# Patient Record
Sex: MALE | Race: WHITE | NOT HISPANIC OR LATINO | Employment: OTHER | URBAN - METROPOLITAN AREA
[De-identification: names, ages, dates, MRNs, and addresses within clinical notes are randomized per-mention and may not be internally consistent; named-entity substitution may affect disease eponyms.]

---

## 2017-02-15 ENCOUNTER — OFFICE VISIT (OUTPATIENT)
Dept: FAMILY MEDICINE CLINIC | Facility: CLINIC | Age: 62
End: 2017-02-15

## 2017-02-15 VITALS
DIASTOLIC BLOOD PRESSURE: 70 MMHG | SYSTOLIC BLOOD PRESSURE: 100 MMHG | HEART RATE: 88 BPM | BODY MASS INDEX: 25.11 KG/M2 | WEIGHT: 160 LBS | TEMPERATURE: 97.7 F | HEIGHT: 67 IN | OXYGEN SATURATION: 98 %

## 2017-02-15 DIAGNOSIS — E78.1 PURE HYPERGLYCERIDEMIA: Primary | ICD-10-CM

## 2017-02-15 DIAGNOSIS — I25.10 ASCVD (ARTERIOSCLEROTIC CARDIOVASCULAR DISEASE): ICD-10-CM

## 2017-02-15 PROCEDURE — 99212 OFFICE O/P EST SF 10 MIN: CPT | Performed by: FAMILY MEDICINE

## 2017-02-15 NOTE — PROGRESS NOTES
Chief Complaint and HPI   I have reviewed the patient's medical history in detail and updated the computerized patient record.    Subjective: Ollie Flood is a 61 y.o. male presents   here today for .    Congestive Heart Failure (heart MD= Dr. Vásquez-good exercise tolerance-has ventricular assist device); Anticoagulation (checked q 6 weeks); and Hyperlipidemia (managed by [facility])      Review of Systems   Constitutional: Negative.    HENT: Negative.    Eyes: Negative.    Respiratory: Negative.    Cardiovascular: Negative.    Gastrointestinal: Negative.    Endocrine: Negative.    Genitourinary: Negative.    Musculoskeletal: Negative.    Skin: Negative.    Allergic/Immunologic: Negative.    Neurological: Negative.    Hematological: Negative.    Psychiatric/Behavioral: Negative.        Physical Exam   Constitutional: He is oriented to person, place, and time. He appears well-developed and well-nourished.   Cardiovascular: Normal rate, regular rhythm and normal heart sounds.    Pulmonary/Chest: Effort normal and breath sounds normal.   Neurological: He is alert and oriented to person, place, and time.   Vitals reviewed.      Procedures    Assessment:   Diagnosis Plan   1. Pure hyperglyceridemia     2. ASCVD (arteriosclerotic cardiovascular disease)         Plan:  No orders of the defined types were placed in this encounter.      Requested Prescriptions      No prescriptions requested or ordered in this encounter       All tests and consults since last visit reviewed with patient    Return in about 1 year (around 2/15/2018) for Recheck.

## 2017-03-03 ENCOUNTER — TELEPHONE (OUTPATIENT)
Dept: CARDIOLOGY | Facility: CLINIC | Age: 62
End: 2017-03-03

## 2017-03-03 ENCOUNTER — CLINICAL SUPPORT NO REQUIREMENTS (OUTPATIENT)
Dept: CARDIOLOGY | Facility: CLINIC | Age: 62
End: 2017-03-03

## 2017-03-03 DIAGNOSIS — I50.22 CHRONIC SYSTOLIC CONGESTIVE HEART FAILURE (HCC): ICD-10-CM

## 2017-03-03 DIAGNOSIS — I47.20 VENTRICULAR TACHYCARDIA (HCC): Primary | ICD-10-CM

## 2017-03-03 NOTE — TELEPHONE ENCOUNTER
"Dr Ruiz,     We saw Mr Flood in the pacer clinic in [facility] for CAMACHO. Coincidentally it was found he had received ATP therapy yesterday during what he described as \" a panic\" to get help for his wife who had fallen at the nursing home. He let himself get \"worked  up\" because help did not arrive as quickly as he hoped.    Seen in clinic to [facility] for CAMACHO. Battery conts at 2.63V but has not reached CAMACHO. Normal CRT-D function, parameters within limits. Presents AP/BiVP @ 70bpm. Apace 41.7%, Total VPace 97.8%. Of that, BiVP 96.2%, VSR 2.0%. VS 1.8%. 3 NST and 1 FVT/VF episode all occurring 3/2/17 at 7251-7142. terminating after successful ATP seq x1. Pt states he knows his HR was up yesterday during a panic attack. His wife had fallen at the nursing home and he was trying to get her help. his panick attack coincides with the time of his tachy events. No other events recorded, no changes made. Msg and strips to Dr Ruiz. Pt verifies he knows to call if he receives a shock. He does not have a home monitor. We will re eval in the clinic in 6 weeks to watch for CAMACHO. Demonstrated alert tone. Pt states he has been listening for it and will cont to do so. I put a copy of his strips on your desk. Let me know if you need anything else.  ~ Silvana      "

## 2017-03-03 NOTE — TELEPHONE ENCOUNTER
"Dr Vásquez,    Just wanted to let you know we saw Mr Flood in the pacer clinic in [facility] for CAMACHO. Coincidentally it was found he had received ATP therapy yesterday during what he described as \" a panic\" to get help for his wife who had fallen at the nursing home. He let himself get \"worked  up\" because help did not arrive as quickly as he hoped. I also sent this msg and strips to Dr Ruiz for review.    Seen in clinic to [facility] for CAMACHO. Battery conts at 2.63V but has not reached CAMACHO. Normal CRT-D function, parameters within limits. Presents AP/BiVP @ 70bpm. Apace 41.7%, Total VPace 97.8%. Of that, BiVP 96.2%, VSR 2.0%. VS 1.8%. 3 NST and 1 FVT/VF episode all occurring 3/2/17 at 9507-1802. terminating after successful ATP seq x1. Pt states he knows his HR was up yesterday during a panic attack. His wife had fallen at the nursing home and he was trying to get her help. his panick attack coincides with the time of his tachy events. No other events recorded, no changes made. Msg and strips to Dr Ruiz. Pt verifies he knows to call if he receives a shock. He does not have a home monitor. We will re eval in the clinic in 6 weeks to watch for CAMACHO. Demonstrated alert tone. Pt states he has been listening for it and will cont to do so.  ~ Silvana    "

## 2017-04-03 ENCOUNTER — CLINICAL SUPPORT NO REQUIREMENTS (OUTPATIENT)
Dept: CARDIOLOGY | Facility: CLINIC | Age: 62
End: 2017-04-03

## 2017-04-03 ENCOUNTER — TELEPHONE (OUTPATIENT)
Dept: CARDIOLOGY | Facility: CLINIC | Age: 62
End: 2017-04-03

## 2017-04-03 DIAGNOSIS — I42.9 CARDIOMYOPATHY (HCC): Primary | ICD-10-CM

## 2017-04-03 NOTE — TELEPHONE ENCOUNTER
Dr. Ruiz and Dr. Vásquez,     Pt's Medtronic CRT-D device has received CAMACHO, see below interrogation note. He has a HMII LVAD.  I'm not sure how to proceed with aiding him in scheduling the generator change at [facility].  Can you please advise?    Pt called and stated his device was beeping. Pt brought to the clinic and he is RRT (CAMACHO) as of 3/29/17. Presenting rhythm AS/BVP at 82. Automated testing WNL. Pt's fluid index > threshold in mid March and has since fallen well below the threshold. Thoracic impedance back at baseline. 2 VT episodes since last interrogation. One lasting 8 beats and one lasting 56 beats. EGM reviewed and no therapies delivered; this was appropriate per pt's settings. Pt did inform me that the day these events occurred, his wife had passed away.     Pt does have a HMII LVAD and I believe his generator change will need to be arranged at [facility].

## 2017-04-03 NOTE — TELEPHONE ENCOUNTER
I discussed with JÚNIOR Wallace, who used to work with the [facility] LVAD program. She stated to send the last interrogation report to the LVAD coordinator, Jocelyn, whom would arrange for generator change for pt.     Report faxed to [phone]    I called pt and informed him that the information was faxed and to be expecting a call from his LVAD program.

## 2017-04-03 NOTE — TELEPHONE ENCOUNTER
I would touch base with Dr Ruiz, if he cannot do than we need to notify the LVAD dept there to arrange it.MJI

## 2017-04-14 ENCOUNTER — TELEPHONE (OUTPATIENT)
Dept: CARDIOLOGY | Facility: CLINIC | Age: 62
End: 2017-04-14

## 2017-04-14 NOTE — TELEPHONE ENCOUNTER
Patient has some questions regarding his pacemaker and would like for you to call him.    He can be reached at [phone].    Thank you.

## 2017-04-16 NOTE — TELEPHONE ENCOUNTER
Called spoke with him he had his questiones answered he is going forward with generator change.MONCIA

## 2017-05-01 ENCOUNTER — OFFICE VISIT (OUTPATIENT)
Dept: FAMILY MEDICINE CLINIC | Facility: CLINIC | Age: 62
End: 2017-05-01

## 2017-05-01 ENCOUNTER — HOSPITAL ENCOUNTER (OUTPATIENT)
Dept: GENERAL RADIOLOGY | Facility: HOSPITAL | Age: 62
Discharge: HOME | End: 2017-05-01
Admitting: NURSE PRACTITIONER

## 2017-05-01 VITALS
DIASTOLIC BLOOD PRESSURE: 64 MMHG | WEIGHT: 160.5 LBS | TEMPERATURE: 97.9 F | OXYGEN SATURATION: 97 % | HEART RATE: 97 BPM | HEIGHT: 67 IN | BODY MASS INDEX: 25.19 KG/M2 | SYSTOLIC BLOOD PRESSURE: 96 MMHG

## 2017-05-01 DIAGNOSIS — M65.321 TRIGGER INDEX FINGER OF RIGHT HAND: Primary | ICD-10-CM

## 2017-05-01 PROCEDURE — 73130 X-RAY EXAM OF HAND: CPT

## 2017-05-01 PROCEDURE — 99213 OFFICE O/P EST LOW 20 MIN: CPT | Performed by: NURSE PRACTITIONER

## 2017-05-03 ENCOUNTER — OFFICE VISIT (OUTPATIENT)
Dept: CARDIOLOGY | Facility: CLINIC | Age: 62
End: 2017-05-03

## 2017-05-03 VITALS
HEIGHT: 67 IN | DIASTOLIC BLOOD PRESSURE: 70 MMHG | HEART RATE: 90 BPM | SYSTOLIC BLOOD PRESSURE: 110 MMHG | BODY MASS INDEX: 25.43 KG/M2 | WEIGHT: 162 LBS

## 2017-05-03 DIAGNOSIS — I50.22 CHRONIC SYSTOLIC CONGESTIVE HEART FAILURE (HCC): Primary | ICD-10-CM

## 2017-05-03 DIAGNOSIS — I42.9 CARDIOMYOPATHY (HCC): ICD-10-CM

## 2017-05-03 DIAGNOSIS — I47.20 VENTRICULAR TACHYCARDIA (HCC): ICD-10-CM

## 2017-05-03 DIAGNOSIS — E78.5 HYPERLIPIDEMIA, UNSPECIFIED HYPERLIPIDEMIA TYPE: ICD-10-CM

## 2017-05-03 PROCEDURE — 99213 OFFICE O/P EST LOW 20 MIN: CPT | Performed by: INTERNAL MEDICINE

## 2017-05-03 PROCEDURE — 93000 ELECTROCARDIOGRAM COMPLETE: CPT | Performed by: INTERNAL MEDICINE

## 2017-05-08 ENCOUNTER — OFFICE VISIT (OUTPATIENT)
Dept: FAMILY MEDICINE CLINIC | Facility: CLINIC | Age: 62
End: 2017-05-08

## 2017-05-08 VITALS
TEMPERATURE: 98 F | BODY MASS INDEX: 25.38 KG/M2 | WEIGHT: 161.7 LBS | HEIGHT: 67 IN | SYSTOLIC BLOOD PRESSURE: 130 MMHG | OXYGEN SATURATION: 99 % | HEART RATE: 71 BPM | DIASTOLIC BLOOD PRESSURE: 84 MMHG

## 2017-05-08 DIAGNOSIS — M19.90 ARTHRITIS: Primary | ICD-10-CM

## 2017-05-08 PROCEDURE — 99213 OFFICE O/P EST LOW 20 MIN: CPT | Performed by: FAMILY MEDICINE

## 2017-05-08 RX ORDER — FINASTERIDE 5 MG/1
TABLET, FILM COATED ORAL
COMMUNITY
Start: 2017-04-21 | End: 2017-11-03

## 2017-05-08 RX ORDER — LISINOPRIL 5 MG/1
TABLET ORAL
COMMUNITY
Start: 2017-04-21 | End: 2017-11-03

## 2017-05-08 RX ORDER — CLINDAMYCIN HYDROCHLORIDE 300 MG/1
CAPSULE ORAL
COMMUNITY
Start: 2017-04-21 | End: 2017-11-03

## 2017-05-08 RX ORDER — METHYLPREDNISOLONE 4 MG/1
TABLET ORAL
Qty: 21 TABLET | Refills: 0 | Status: SHIPPED | OUTPATIENT
Start: 2017-05-08 | End: 2017-05-20

## 2017-05-09 LAB
ANA SER QL: NEGATIVE
ERYTHROCYTE [SEDIMENTATION RATE] IN BLOOD BY WESTERGREN METHOD: 7 MM/HR (ref 0–20)
RHEUMATOID FACT SERPL-ACNC: <10 IU/ML (ref 0–13.9)
URATE SERPL-MCNC: 4.3 MG/DL (ref 3.4–7)

## 2017-05-16 ENCOUNTER — TELEPHONE (OUTPATIENT)
Dept: FAMILY MEDICINE CLINIC | Facility: CLINIC | Age: 62
End: 2017-05-16

## 2017-05-18 ENCOUNTER — TELEPHONE (OUTPATIENT)
Dept: FAMILY MEDICINE CLINIC | Facility: CLINIC | Age: 62
End: 2017-05-18

## 2017-05-19 ENCOUNTER — TELEPHONE (OUTPATIENT)
Dept: FAMILY MEDICINE CLINIC | Facility: CLINIC | Age: 62
End: 2017-05-19

## 2017-05-20 ENCOUNTER — OFFICE VISIT (OUTPATIENT)
Dept: FAMILY MEDICINE CLINIC | Facility: CLINIC | Age: 62
End: 2017-05-20

## 2017-05-20 VITALS
HEIGHT: 67 IN | SYSTOLIC BLOOD PRESSURE: 100 MMHG | TEMPERATURE: 97.9 F | DIASTOLIC BLOOD PRESSURE: 72 MMHG | WEIGHT: 160 LBS | BODY MASS INDEX: 25.11 KG/M2

## 2017-05-20 DIAGNOSIS — M19.041 ARTHRITIS OF RIGHT HAND: Primary | ICD-10-CM

## 2017-05-20 PROCEDURE — 99213 OFFICE O/P EST LOW 20 MIN: CPT | Performed by: FAMILY MEDICINE

## 2017-05-20 RX ORDER — MELOXICAM 15 MG/1
15 TABLET ORAL DAILY
Qty: 30 TABLET | Refills: 3 | Status: SHIPPED | OUTPATIENT
Start: 2017-05-20

## 2017-05-22 ENCOUNTER — TELEPHONE (OUTPATIENT)
Dept: FAMILY MEDICINE CLINIC | Facility: CLINIC | Age: 62
End: 2017-05-22

## 2017-05-30 ENCOUNTER — TELEPHONE (OUTPATIENT)
Dept: CARDIOLOGY | Facility: CLINIC | Age: 62
End: 2017-05-30

## 2017-05-30 ENCOUNTER — CLINICAL SUPPORT NO REQUIREMENTS (OUTPATIENT)
Dept: CARDIOLOGY | Facility: CLINIC | Age: 62
End: 2017-05-30

## 2017-05-30 DIAGNOSIS — I47.20 VT (VENTRICULAR TACHYCARDIA) (HCC): Primary | ICD-10-CM

## 2017-05-30 PROCEDURE — 93283 PRGRMG EVAL IMPLANTABLE DFB: CPT | Performed by: INTERNAL MEDICINE

## 2017-05-30 PROCEDURE — 93290 INTERROG DEV EVAL ICPMS IP: CPT | Performed by: INTERNAL MEDICINE

## 2017-05-31 ENCOUNTER — CLINICAL SUPPORT NO REQUIREMENTS (OUTPATIENT)
Dept: CARDIOLOGY | Facility: CLINIC | Age: 62
End: 2017-05-31

## 2017-05-31 DIAGNOSIS — I47.29 PAROXYSMAL VENTRICULAR TACHYCARDIA (HCC): Primary | ICD-10-CM

## 2017-07-31 ENCOUNTER — TELEPHONE (OUTPATIENT)
Dept: CARDIOLOGY | Facility: CLINIC | Age: 62
End: 2017-07-31

## 2017-07-31 ENCOUNTER — CLINICAL SUPPORT NO REQUIREMENTS (OUTPATIENT)
Dept: CARDIOLOGY | Facility: CLINIC | Age: 62
End: 2017-07-31

## 2017-07-31 DIAGNOSIS — I42.9 CARDIOMYOPATHY (HCC): Primary | ICD-10-CM

## 2017-07-31 PROCEDURE — 93295 DEV INTERROG REMOTE 1/2/MLT: CPT | Performed by: INTERNAL MEDICINE

## 2017-07-31 PROCEDURE — 93297 REM INTERROG DEV EVAL ICPMS: CPT | Performed by: INTERNAL MEDICINE

## 2017-07-31 PROCEDURE — 93296 REM INTERROG EVL PM/IDS: CPT | Performed by: INTERNAL MEDICINE

## 2017-11-03 ENCOUNTER — CLINICAL SUPPORT NO REQUIREMENTS (OUTPATIENT)
Dept: CARDIOLOGY | Facility: CLINIC | Age: 62
End: 2017-11-03

## 2017-11-03 ENCOUNTER — OFFICE VISIT (OUTPATIENT)
Dept: CARDIOLOGY | Facility: CLINIC | Age: 62
End: 2017-11-03

## 2017-11-03 VITALS
DIASTOLIC BLOOD PRESSURE: 62 MMHG | SYSTOLIC BLOOD PRESSURE: 98 MMHG | HEART RATE: 84 BPM | HEIGHT: 67 IN | WEIGHT: 173 LBS | BODY MASS INDEX: 27.15 KG/M2 | RESPIRATION RATE: 16 BRPM

## 2017-11-03 DIAGNOSIS — I25.10 CORONARY ARTERY DISEASE INVOLVING NATIVE CORONARY ARTERY OF NATIVE HEART WITHOUT ANGINA PECTORIS: ICD-10-CM

## 2017-11-03 DIAGNOSIS — I47.29 PAROXYSMAL VENTRICULAR TACHYCARDIA (HCC): ICD-10-CM

## 2017-11-03 DIAGNOSIS — E78.2 MIXED HYPERLIPIDEMIA: ICD-10-CM

## 2017-11-03 DIAGNOSIS — I25.5 ISCHEMIC CARDIOMYOPATHY: Primary | ICD-10-CM

## 2017-11-03 DIAGNOSIS — I50.22 CHRONIC SYSTOLIC CONGESTIVE HEART FAILURE (HCC): ICD-10-CM

## 2017-11-03 DIAGNOSIS — I47.20 VENTRICULAR TACHYCARDIA (HCC): Primary | ICD-10-CM

## 2017-11-03 PROCEDURE — 93000 ELECTROCARDIOGRAM COMPLETE: CPT | Performed by: INTERNAL MEDICINE

## 2017-11-03 PROCEDURE — 93290 INTERROG DEV EVAL ICPMS IP: CPT | Performed by: INTERNAL MEDICINE

## 2017-11-03 PROCEDURE — 93283 PRGRMG EVAL IMPLANTABLE DFB: CPT | Performed by: INTERNAL MEDICINE

## 2017-11-03 PROCEDURE — 99214 OFFICE O/P EST MOD 30 MIN: CPT | Performed by: INTERNAL MEDICINE

## 2017-11-03 RX ORDER — LOSARTAN POTASSIUM 25 MG/1
25 TABLET ORAL DAILY
COMMUNITY

## 2017-11-03 NOTE — PROGRESS NOTES
Date of Office Visit: 17  Encounter Provider: Jose Alejandro Vásquez MD  Place of Service: [facility] CARDIOLOGY  Patient Name: Ollie Flood  :1955      No chief complaint on file.    History of Present Illness  HPI Comments:  The patient is a pleasant, 62-year-old gentleman with a history of previous inferior  infarct and acute anterior infarct, prior angioplasty, and total occlusion of the left  anterior descending. He then came back with a markedly abnormal electrocardiogram. Repeat  catheterization showed ejection fraction of 15%, significant three-vessel coronary  disease, 80% stenosis of septal , and 80% stenosis of the diagonal. The  circumflex was totally occluded. The right coronary artery was totally occluded. The left  anterior descending itself had insignificant disease. He was treated medically for that.  He went to [facility] for transplant evaluation, and it was felt that he was too stable for  that. He was also found to have a ventricular tachycardia and had a defibrillator placed.  He then had problems with intermittent congestive heart failure and had that upgraded to a  bi-V device. He developed a pocket infection of the wires and the device had to be  explanted and then reimplanted on the right side. He had a transient ischemic attack in  2010 and had an echocardiogram that showed normal saline contrast study,  questionable apical mural thrombus and then, since then, was maintained on warfarin.   He then presented in 2013 with progressive symptoms of heart failure, no real  angina type symptoms.  He had an echocardiogram that showed severely dilated left  ventricle and a left ventricular ejection fraction of 15%, grade III diastolic  dysfunction, moderate left atrial enlargement, and mild to moderate mitral insufficiency.   As part of that evaluation, he had cardiac catheterization which again confirms severe  left ventricular  dysfunction.  He had 90% in stent stenosis of the left anterior  descending.  The right coronary artery was totally occluded, which is chronic.  The  circumflex was free of disease.  He had normal cardiac output; pressures were normal.  He  had angioplasty and drug eluting stent place left anterior descending.    He then had a metabolic stress test that was markedly abnormal.  Maximal O2 consumption  was only 43% of predicted.   He then presented with acute pulmonary edema on 08/08/2015. He was on a ventilator and  ruled out for a myocardial infarction. He underwent cardiac catheterization which  revealed insignificant coronary disease. Again he had severe left ventricular dysfunction  with an ejection fraction of approximately 10-15%. He was treated medically and went  home.  He then agreed to be evaluated for potential transplant or ventricular assist device.  He had a HeartMate 2 left ventricular assist device placed.  He had a stroke in May 2016.  He comes in for follow-up.  If anything he is doing better he says he denies shortness of breath orthopnea and PND he's had no palpitations no near-syncope or syncope.  He follows up with the [facility] heart failure team every 3 months he gets his INRs checked weekly by his home monitor calls him to the clinic.  And again overall he feels like he's doing much better.  They did restart him on some losartan.    Coronary Artery Disease   Pertinent negatives include no dizziness, muscle weakness or weight gain. His past medical history is significant for CHF.   Cardiomyopathy   Pertinent negatives include no abdominal pain, congestion, diaphoresis, fever, headaches, joint swelling, myalgias, nausea, numbness, rash, vertigo, vomiting or weakness.   Congestive Heart Failure   Pertinent negatives include no abdominal pain, muscle weakness or nocturia. His past medical history is significant for CAD.         Past Medical History:   Diagnosis Date   • ASCVD  (arteriosclerotic cardiovascular disease)    • CHF (congestive heart failure)    • Coronary atherosclerosis    • Diastolic dysfunction     grade 3   • H/O echocardiogram 05/30/2013   • H/O exercise stress test 09/17/2015    treadmill   • Health care maintenance    • History of EKG 09/01/2015   • History of EKG 04/13/2016   • Ischemic cardiomyopathy    • Mitral regurgitation    • Mitral valve insufficiency    • Old myocardial infarct    • Transient cerebral ischemia    • Ventricular tachycardia          Past Surgical History:   Procedure Laterality Date   • CARDIAC CATHETERIZATION      Repeat Cath showed an ejection fraction of 15%, significant three vessel coronary disease, 80% stenosis septal , 80% stenosis of the diagonal. The circumflex was totally occluded. The RCA was totally occluded. The LAD itself had insignificant disease. Was treated medically.   • CARDIAC CATHETERIZATION  04/2013 04/2013; showed 90% in-stent stenosis of the LAD. The right carotid was totally occluded. The circumflex was free of disease.   • CARDIAC DEFIBRILLATOR PLACEMENT      biventricular   • CORONARY ANGIOPLASTY      04/2013; Angioplasty and drug-eluting stent of the LAD.         Current Outpatient Prescriptions on File Prior to Visit   Medication Sig Dispense Refill   • aspirin 325 MG tablet Take 325 mg by mouth daily.     • atorvastatin (LIPITOR) 40 MG tablet Take 40 mg by mouth Daily.     • magnesium oxide (MAG-OX) 400 MG tablet Take 400 mg by mouth 2 (Two) Times a Day.     • meloxicam (MOBIC) 15 MG tablet Take 1 tablet by mouth Daily. 30 tablet 3   • warfarin (COUMADIN) 1 MG tablet Take by mouth.     • [DISCONTINUED] clindamycin (CLEOCIN) 300 MG capsule      • [DISCONTINUED] finasteride (PROSCAR) 5 MG tablet      • [DISCONTINUED] lisinopril (PRINIVIL,ZESTRIL) 5 MG tablet        No current facility-administered medications on file prior to visit.          Social History     Social History   • Marital status:       Spouse name: N/A   • Number of children: N/A   • Years of education: N/A     Occupational History   • Not on file.     Social History Main Topics   • Smoking status: Former Smoker   • Smokeless tobacco: Not on file      Comment: caffeine use   • Alcohol use No   • Drug use: No   • Sexual activity: Defer     Other Topics Concern   • Not on file     Social History Narrative       Family History   Problem Relation Age of Onset   • Heart disease Mother      CABG   • Diabetes Mother    • Pancreatic cancer Father    • Diabetes Brother    • Heart disease Son      CABG         Review of Systems   Constitution: Negative for decreased appetite, diaphoresis, fever, weakness, malaise/fatigue, weight gain and weight loss.   HENT: Negative for congestion, hearing loss, nosebleeds and tinnitus.    Eyes: Negative for blurred vision, double vision, vision loss in left eye, vision loss in right eye and visual disturbance.   Cardiovascular:        As noted in HPI   Respiratory:        As noted HPI   Endocrine: Negative for cold intolerance and heat intolerance.   Hematologic/Lymphatic: Negative for bleeding problem. Does not bruise/bleed easily.   Skin: Negative for color change, flushing, itching and rash.   Musculoskeletal: Negative for arthritis, back pain, joint pain, joint swelling, muscle weakness and myalgias.   Gastrointestinal: Negative for bloating, abdominal pain, constipation, diarrhea, dysphagia, heartburn, hematemesis, hematochezia, melena, nausea and vomiting.   Genitourinary: Negative for bladder incontinence, dysuria, frequency, nocturia and urgency.   Neurological: Negative for dizziness, focal weakness, headaches, light-headedness, loss of balance, numbness, paresthesias and vertigo.   Psychiatric/Behavioral: Negative for depression, memory loss and substance abuse.       Procedures      ECG 12 Lead  Date/Time: 11/3/2017 2:03 PM  Performed by: KYLEE FERRELL  Authorized by: KYLEE FERRELL   Comparison:  "compared with previous ECG   Rhythm: sinus rhythm  Rate: normal  Conduction: left bundle branch block  QRS axis: normal  Other findings: LAE and ASHLEY               Objective:    BP 98/62 (BP Location: Right arm, Patient Position: Sitting, Cuff Size: Adult)  Pulse 84  Resp 16  Ht 67\" (170.2 cm)  Wt 173 lb (78.5 kg)  BMI 27.1 kg/m2       Physical Exam  Physical Exam   Constitutional: He is oriented to person, place, and time. He appears well-developed and well-nourished. No distress.   HENT:   Head: Normocephalic.   Eyes: Conjunctivae are normal. Pupils are equal, round, and reactive to light. No scleral icterus.   Neck: Normal carotid pulses, no hepatojugular reflux and no JVD present. Carotid bruit is not present. No tracheal deviation, no edema and no erythema present. No thyromegaly present.   Cardiovascular: Normal rate, regular rhythm, S1 normal, S2 normal, normal heart sounds and intact distal pulses.   No extrasystoles are present. PMI is not displaced.  Exam reveals no gallop, no distant heart sounds and no friction rub.    No murmur heard.  Pulses:       Carotid pulses are 2+ on the right side, and 2+ on the left side.       Radial pulses are 2+ on the right side, and 2+ on the left side.        Femoral pulses are 2+ on the right side, and 2+ on the left side.       Dorsalis pedis pulses are 2+ on the right side, and 2+ on the left side.        Posterior tibial pulses are 2+ on the right side, and 2+ on the left side.   Hmm from his VAD   Pulmonary/Chest: Effort normal and breath sounds normal. No respiratory distress. He has no decreased breath sounds. He has no wheezes. He has no rhonchi. He has no rales. He exhibits no tenderness.   Abdominal: Soft. Bowel sounds are normal. He exhibits no distension and no mass. There is no hepatosplenomegaly. There is no tenderness. There is no rebound and no guarding.   Musculoskeletal: He exhibits no edema, tenderness or deformity.   Neurological: He is alert and " oriented to person, place, and time.   Skin: Skin is warm and dry. No rash noted. He is not diaphoretic. No cyanosis or erythema. No pallor. Nails show no clubbing.   Psychiatric: He has a normal mood and affect. His speech is normal and behavior is normal. Judgment and thought content normal.           Assessment:   1. This is a 62-year-old gentleman with history of severe ischemic cardiomyopathy. Not a re-operative candidate due to nonviable myocardium. Recurrent heart failure status post  BiV AICD placement. Then worsening symptoms and an echocardiogram in 04/2013 showed a left ventricular ejection fraction of 20%, mild mitral insufficiency, grade III diastolic  dysfunction. Catheterization showed normal pressures but new severe disease in the left anterior descending and underwent angioplasty with drug-eluting stent placement to that  vessel. He presented with acute pulmonary edema in 08/2015. Catheterization showed insignificant disease and a left ventricular ejection fraction of 15%.  Status post left ventricular assist device being followed at the [facility] heart failure clinic. He is now probably New [place] Heart Association class II.  Although he says he has no dyspnea.  I do think he is doing better.  He's been intolerant of multiple medications due to low blood pressure.    2. Congestive heart failure as outlined above.   3. Hyperlipidemia on rosuvastatin.   4. History of CVA/TIA in the past due to apical thrombus.  And a recurrent episode after his ventricular assist device was placed on chronic warfarin therapy.  5. Status post bi-V defibrillator will continue following her defibrillator clinic.  On his ECG today he does not appear to be ventricularly paced he was however having this followed at [facility] adjusted.           Plan:

## 2018-02-06 ENCOUNTER — CLINICAL SUPPORT NO REQUIREMENTS (OUTPATIENT)
Dept: CARDIOLOGY | Facility: CLINIC | Age: 63
End: 2018-02-06

## 2018-02-06 DIAGNOSIS — I47.20 VENTRICULAR TACHYCARDIA (HCC): Primary | ICD-10-CM

## 2018-02-06 PROCEDURE — 93296 REM INTERROG EVL PM/IDS: CPT | Performed by: INTERNAL MEDICINE

## 2018-02-06 PROCEDURE — 93297 REM INTERROG DEV EVAL ICPMS: CPT | Performed by: INTERNAL MEDICINE

## 2018-02-06 PROCEDURE — 93295 DEV INTERROG REMOTE 1/2/MLT: CPT | Performed by: INTERNAL MEDICINE

## 2018-05-04 ENCOUNTER — OFFICE VISIT (OUTPATIENT)
Dept: CARDIOLOGY | Facility: CLINIC | Age: 63
End: 2018-05-04

## 2018-05-04 ENCOUNTER — CLINICAL SUPPORT NO REQUIREMENTS (OUTPATIENT)
Dept: CARDIOLOGY | Facility: CLINIC | Age: 63
End: 2018-05-04

## 2018-05-04 VITALS
SYSTOLIC BLOOD PRESSURE: 90 MMHG | HEART RATE: 75 BPM | WEIGHT: 171 LBS | BODY MASS INDEX: 26.84 KG/M2 | DIASTOLIC BLOOD PRESSURE: 70 MMHG | HEIGHT: 67 IN

## 2018-05-04 DIAGNOSIS — I50.22 CHRONIC SYSTOLIC CONGESTIVE HEART FAILURE (HCC): ICD-10-CM

## 2018-05-04 DIAGNOSIS — I47.20 VENTRICULAR TACHYCARDIA (HCC): Primary | ICD-10-CM

## 2018-05-04 DIAGNOSIS — I25.5 ISCHEMIC CARDIOMYOPATHY: ICD-10-CM

## 2018-05-04 DIAGNOSIS — E78.2 MIXED HYPERLIPIDEMIA: ICD-10-CM

## 2018-05-04 DIAGNOSIS — I47.29 PAROXYSMAL VENTRICULAR TACHYCARDIA (HCC): ICD-10-CM

## 2018-05-04 PROCEDURE — 93290 INTERROG DEV EVAL ICPMS IP: CPT | Performed by: INTERNAL MEDICINE

## 2018-05-04 PROCEDURE — 99213 OFFICE O/P EST LOW 20 MIN: CPT | Performed by: INTERNAL MEDICINE

## 2018-05-04 PROCEDURE — 93283 PRGRMG EVAL IMPLANTABLE DFB: CPT | Performed by: INTERNAL MEDICINE

## 2018-05-04 NOTE — PROGRESS NOTES
Date of Office Visit: 18  Encounter Provider: Jose Alejandro Vásquez MD  Place of Service: [facility] CARDIOLOGY  Patient Name: Ollie Flood  :1955  Referral Provider:No ref. provider found      No chief complaint on file.    History of Present Illness   The patient is a pleasant, 62-year-old gentleman with a history of previous inferior  infarct and acute anterior infarct, prior angioplasty, and total occlusion of the left  anterior descending. He then came back with a markedly abnormal electrocardiogram. Repeat  catheterization showed ejection fraction of 15%, significant three-vessel coronary  disease, 80% stenosis of septal , and 80% stenosis of the diagonal. The  circumflex was totally occluded. The right coronary artery was totally occluded. The left  anterior descending itself had insignificant disease. He was treated medically for that.  He went to [place] for transplant evaluation, and it was felt that he was too stable for  that. He was also found to have a ventricular tachycardia and had a defibrillator placed.  He then had problems with intermittent congestive heart failure and had that upgraded to a  bi-V device. He developed a pocket infection of the wires and the device had to be  explanted and then reimplanted on the right side. He had a transient ischemic attack in  2010 and had an echocardiogram that showed normal saline contrast study,  questionable apical mural thrombus and then, since then, was maintained on warfarin.   He then presented in 2013 with progressive symptoms of heart failure, no real  angina type symptoms.  He had an echocardiogram that showed severely dilated left  ventricle and a left ventricular ejection fraction of 15%, grade III diastolic  dysfunction, moderate left atrial enlargement, and mild to moderate mitral insufficiency.   As part of that evaluation, he had cardiac catheterization which again confirms  severe  left ventricular dysfunction.  He had 90% in stent stenosis of the left anterior  descending.  The right coronary artery was totally occluded, which is chronic.  The  circumflex was free of disease.  He had normal cardiac output; pressures were normal.  He  had angioplasty and drug eluting stent place left anterior descending.    He then had a metabolic stress test that was markedly abnormal.  Maximal O2 consumption  was only 43% of predicted.   He then presented with acute pulmonary edema on 08/08/2015. He was on a ventilator and  ruled out for a myocardial infarction. He underwent cardiac catheterization which  revealed insignificant coronary disease. Again he had severe left ventricular dysfunction  with an ejection fraction of approximately 10-15%. He was treated medically and went  home.  He then agreed to be evaluated for potential transplant or ventricular assist device.  He had a HeartMate 2 left ventricular assist device placed.  He had a stroke in May 2016.  He comes in for follow-up.  He still doing well.  He works in the yard he could probably walk a mile without problems.  No chest pain or pressure no orthopnea or PND no palpitations no near-syncope or syncope.      Coronary Artery Disease   Pertinent negatives include no dizziness, muscle weakness or weight gain. His past medical history is significant for CHF.   Cardiomyopathy   Pertinent negatives include no abdominal pain, congestion, diaphoresis, fever, headaches, joint swelling, myalgias, nausea, numbness, rash, vertigo, vomiting or weakness.   Congestive Heart Failure   Pertinent negatives include no abdominal pain, muscle weakness or nocturia. His past medical history is significant for CAD.         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 The circumflex was totally occluded. The RCA was totally occluded. The LAD itself had insignificant disease. Was treated medically.   • CARDIAC CATHETERIZATION  04/2013 04/2013; showed 90% in-stent stenosis of the LAD. The right carotid was totally occluded. The circumflex was free of disease.   • CARDIAC DEFIBRILLATOR PLACEMENT      biventricular   • CORONARY ANGIOPLASTY      04/2013; Angioplasty and drug-eluting stent of the LAD.         Current Outpatient Prescriptions on File Prior to Visit   Medication Sig Dispense Refill   • aspirin 325 MG tablet Take 325 mg by mouth daily.     • atorvastatin (LIPITOR) 40 MG tablet Take 40 mg by mouth Daily.     • losartan (COZAAR) 25 MG tablet Take 12.5 mg by mouth Daily.     • magnesium oxide (MAG-OX) 400 MG tablet Take 400 mg by mouth 2 (Two) Times a Day.     • meloxicam (MOBIC) 15 MG tablet Take 1 tablet by mouth Daily. 30 tablet 3   • warfarin (COUMADIN) 1 MG tablet Take by mouth.       No current facility-administered medications on file prior to visit.          Social History     Social History   • Marital status:      Spouse name: N/A   • Number of children: N/A   • Years of education: N/A     Occupational History   • Not on file.     Social History Main Topics   • Smoking status: Former Smoker   • Smokeless tobacco: Not on file      Comment: caffeine use   • Alcohol use No   • Drug use: No   • Sexual activity:  Defer     Other Topics Concern   • Not on file     Social History Narrative   • No narrative on file       Family History   Problem Relation Age of Onset   • Heart disease Mother      CABG   • Diabetes Mother    • Pancreatic cancer Father    • Diabetes Brother    • Heart disease Son      CABG         Review of Systems   Constitution: Negative for decreased appetite, diaphoresis, fever, weakness, malaise/fatigue, weight gain and weight loss.   HENT: Negative for congestion, hearing loss, nosebleeds and tinnitus.    Eyes: Negative for blurred vision, double vision, vision loss in left eye, vision loss in right eye and visual disturbance.   Cardiovascular:        As noted in HPI   Respiratory:        As noted HPI   Endocrine: Negative for cold intolerance and heat intolerance.   Hematologic/Lymphatic: Negative for bleeding problem. Does not bruise/bleed easily.   Skin: Negative for color change, flushing, itching and rash.   Musculoskeletal: Negative for arthritis, back pain, joint pain, joint swelling, muscle weakness and myalgias.   Gastrointestinal: Negative for bloating, abdominal pain, constipation, diarrhea, dysphagia, heartburn, hematemesis, hematochezia, melena, nausea and vomiting.   Genitourinary: Negative for bladder incontinence, dysuria, frequency, nocturia and urgency.   Neurological: Negative for dizziness, focal weakness, headaches, light-headedness, loss of balance, numbness, paresthesias and vertigo.   Psychiatric/Behavioral: Negative for depression, memory loss and substance abuse.       Procedures    Procedures        Objective:    There were no vitals taken for this visit.       Physical Exam  Physical Exam   Constitutional: He is oriented to person, place, and time. He appears well-developed and well-nourished. No distress.   HENT:   Head: Normocephalic.   Eyes: Conjunctivae are normal. Pupils are equal, round, and reactive to light. No scleral icterus.   Neck: Normal carotid pulses, no  hepatojugular reflux and no JVD present. Carotid bruit is not present. No tracheal deviation, no edema and no erythema present. No thyromegaly present.   Cardiovascular: Normal rate, regular rhythm and intact distal pulses.   No extrasystoles are present. PMI is not displaced.  Exam reveals no gallop, no distant heart sounds and no friction rub.    Murmur heard.   Systolic murmur is present with a grade of 2/6   Pulses:       Carotid pulses are 2+ on the right side, and 2+ on the left side.       Radial pulses are 2+ on the right side, and 2+ on the left side.        Femoral pulses are 2+ on the right side, and 2+ on the left side.       Dorsalis pedis pulses are 2+ on the right side, and 2+ on the left side.        Posterior tibial pulses are 2+ on the right side, and 2+ on the left side.   Hum from LVAD   Pulmonary/Chest: Effort normal and breath sounds normal. No respiratory distress. He has no decreased breath sounds. He has no wheezes. He has no rhonchi. He has no rales. He exhibits no tenderness.   Abdominal: Soft. Bowel sounds are normal. He exhibits no distension and no mass. There is no hepatosplenomegaly. There is no tenderness. There is no rebound and no guarding.   LVAD   Musculoskeletal: He exhibits no edema, tenderness or deformity.   Neurological: He is alert and oriented to person, place, and time.   Skin: Skin is warm and dry. No rash noted. He is not diaphoretic. No cyanosis or erythema. No pallor. Nails show no clubbing.   Psychiatric: He has a normal mood and affect. His speech is normal and behavior is normal. Judgment and thought content normal.           Assessment:    1. This is a 62-year-old gentleman with history of severe ischemic cardiomyopathy. Not a re-operative candidate due to nonviable myocardium. Recurrent heart failure status post  BiV AICD placement. Then worsening symptoms and an echocardiogram in 04/2013 showed a left ventricular ejection fraction of 20%, mild mitral  insufficiency, grade III diastolic  dysfunction. Catheterization showed normal pressures but new severe disease in the left anterior descending and underwent angioplasty with drug-eluting stent placement to that  vessel. He presented with acute pulmonary edema in 08/2015. Catheterization showed insignificant disease and a left ventricular ejection fraction of 15%.  Status post left ventricular assist device being followed at the [facility] heart failure clinic.  2. Congestive heart failure as outlined above.   3. Hyperlipidemia on rosuvastatin.   4. History of CVA/TIA in the past due to apical thrombus.  And a recurrent episode after his ventricular assist device was placed on chronic warfarin therapy.  5. Status post bi-V defibrillator will continue following her defibrillator clinic.          Plan:

## 2018-07-16 ENCOUNTER — CLINICAL SUPPORT NO REQUIREMENTS (OUTPATIENT)
Dept: CARDIOLOGY | Facility: CLINIC | Age: 63
End: 2018-07-16

## 2018-07-16 ENCOUNTER — TELEPHONE (OUTPATIENT)
Dept: CARDIOLOGY | Facility: CLINIC | Age: 63
End: 2018-07-16

## 2018-07-16 DIAGNOSIS — I47.20 VENTRICULAR TACHYCARDIA (HCC): Primary | ICD-10-CM

## 2018-07-16 PROCEDURE — 93297 REM INTERROG DEV EVAL ICPMS: CPT | Performed by: INTERNAL MEDICINE

## 2018-07-16 PROCEDURE — 93295 DEV INTERROG REMOTE 1/2/MLT: CPT | Performed by: INTERNAL MEDICINE

## 2018-07-16 PROCEDURE — 93296 REM INTERROG EVL PM/IDS: CPT | Performed by: INTERNAL MEDICINE

## 2018-07-16 NOTE — TELEPHONE ENCOUNTER
Remote check received. Normal dual chamber ICD function. 3 NST episodes were reported, all from June. Presenting rhythm was AS/VS@ 88bpm.

## 2018-07-16 NOTE — TELEPHONE ENCOUNTER
Pt called and states he is having trouble with his blood pressure and that whoever follows VAD wanted his device interrogated for episodes.  He will send manually today

## 2018-11-06 ENCOUNTER — CLINICAL SUPPORT NO REQUIREMENTS (OUTPATIENT)
Dept: CARDIOLOGY | Facility: CLINIC | Age: 63
End: 2018-11-06

## 2018-11-06 ENCOUNTER — OFFICE VISIT (OUTPATIENT)
Dept: CARDIOLOGY | Facility: CLINIC | Age: 63
End: 2018-11-06

## 2018-11-06 VITALS — HEIGHT: 67 IN | WEIGHT: 165 LBS | BODY MASS INDEX: 25.9 KG/M2

## 2018-11-06 DIAGNOSIS — I25.10 ASCVD (ARTERIOSCLEROTIC CARDIOVASCULAR DISEASE): Primary | ICD-10-CM

## 2018-11-06 DIAGNOSIS — I50.22 CHRONIC SYSTOLIC CONGESTIVE HEART FAILURE (HCC): ICD-10-CM

## 2018-11-06 DIAGNOSIS — I25.5 ISCHEMIC CARDIOMYOPATHY: ICD-10-CM

## 2018-11-06 DIAGNOSIS — E78.2 MIXED HYPERLIPIDEMIA: ICD-10-CM

## 2018-11-06 DIAGNOSIS — I47.20 VENTRICULAR TACHYCARDIA (HCC): Primary | ICD-10-CM

## 2018-11-06 PROCEDURE — 93283 PRGRMG EVAL IMPLANTABLE DFB: CPT | Performed by: INTERNAL MEDICINE

## 2018-11-06 PROCEDURE — 99212 OFFICE O/P EST SF 10 MIN: CPT | Performed by: INTERNAL MEDICINE

## 2018-11-06 PROCEDURE — 93290 INTERROG DEV EVAL ICPMS IP: CPT | Performed by: INTERNAL MEDICINE

## 2018-11-06 NOTE — PROGRESS NOTES
Date of Office Visit: 18  Encounter Provider: Jose Alejandro Vásquez MD  Place of Service: [facility] CARDIOLOGY  Patient Name: Ollie Flood  :1955  Referral Provider:Jose Alejandro Vásquez MD      Chief Complaint   Patient presents with   • Ventricular tachycardia     History of Present Illness   The patient is a pleasant, 63-year-old gentleman with a history of previous inferior  infarct and acute anterior infarct, prior angioplasty, and total occlusion of the left  anterior descending. He then came back with a markedly abnormal electrocardiogram. Repeat  catheterization showed ejection fraction of 15%, significant three-vessel coronary  disease, 80% stenosis of septal , and 80% stenosis of the diagonal. The  circumflex was totally occluded. The right coronary artery was totally occluded. The left  anterior descending itself had insignificant disease. He was treated medically for that.  He went to [place] for transplant evaluation, and it was felt that he was too stable for  that. He was also found to have a ventricular tachycardia and had a defibrillator placed.  He then had problems with intermittent congestive heart failure and had that upgraded to a  bi-V device. He developed a pocket infection of the wires and the device had to be  explanted and then reimplanted on the right side. He had a transient ischemic attack in  2010 and had an echocardiogram that showed normal saline contrast study,  questionable apical mural thrombus and then, since then, was maintained on warfarin.   He then presented in 2013 with progressive symptoms of heart failure, no real  angina type symptoms.  He had an echocardiogram that showed severely dilated left  ventricle and a left ventricular ejection fraction of 15%, grade III diastolic  dysfunction, moderate left atrial enlargement, and mild to moderate mitral insufficiency.   As part of that evaluation, he had cardiac  catheterization which again confirms severe  left ventricular dysfunction.  He had 90% in stent stenosis of the left anterior  descending.  The right coronary artery was totally occluded, which is chronic.  The  circumflex was free of disease.  He had normal cardiac output; pressures were normal.  He  had angioplasty and drug eluting stent place left anterior descending.    He then had a metabolic stress test that was markedly abnormal.  Maximal O2 consumption  was only 43% of predicted.   He then presented with acute pulmonary edema on 08/08/2015. He was on a ventilator and  ruled out for a myocardial infarction. He underwent cardiac catheterization which  revealed insignificant coronary disease. Again he had severe left ventricular dysfunction  with an ejection fraction of approximately 10-15%. He was treated medically and went  home.  He then agreed to be evaluated for potential transplant or ventricular assist device.  He had a HeartMate 2 left ventricular assist device placed.  He had a stroke in May 2016.  He comes in for follow-up.  Section doing fairly well.  His son in here moving to [place].  This is be his last visit here he denies chest pain or shortness of breath denies near syncope or syncope.      Coronary Artery Disease   Pertinent negatives include no dizziness, muscle weakness or weight gain. His past medical history is significant for CHF.   Cardiomyopathy   Pertinent negatives include no abdominal pain, congestion, diaphoresis, fever, headaches, joint swelling, myalgias, nausea, numbness, rash, vertigo, vomiting or weakness.   Congestive Heart Failure   Pertinent negatives include no abdominal pain, muscle weakness or nocturia. His past medical history is significant for CAD.         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 The circumflex was totally occluded. The RCA was totally occluded. The LAD itself had insignificant disease. Was treated medically.   • CARDIAC CATHETERIZATION  04/2013 04/2013; showed 90% in-stent stenosis of the LAD. The right carotid was totally occluded. The circumflex was free of disease.   • CARDIAC DEFIBRILLATOR PLACEMENT      biventricular   • CORONARY ANGIOPLASTY      04/2013; Angioplasty and drug-eluting stent of the LAD.         Current Outpatient Prescriptions on File Prior to Visit   Medication Sig Dispense Refill   • aspirin 325 MG tablet Take 325 mg by mouth daily.     • atorvastatin (LIPITOR) 40 MG tablet Take 40 mg by mouth Daily.     • losartan (COZAAR) 25 MG tablet Take 25 mg by mouth Daily.     • magnesium oxide (MAG-OX) 400 MG tablet Take 400 mg by mouth 2 (Two) Times a Day.     • meloxicam (MOBIC) 15 MG tablet Take 1 tablet by mouth Daily. 30 tablet 3   • warfarin (COUMADIN) 1 MG tablet Take 1 mg by mouth Daily.       No current facility-administered medications on file prior to visit.          Social History     Social History   • Marital status:      Spouse name: N/A   • Number of children: N/A   • Years of education: N/A     Occupational History   • Not on file.     Social History Main Topics   • Smoking status: Former Smoker   • Smokeless tobacco: Not on file      Comment: Daily caffeine use  "  • Alcohol use No   • Drug use: No   • Sexual activity: Defer     Other Topics Concern   • Not on file     Social History Narrative   • No narrative on file       Family History   Problem Relation Age of Onset   • Heart disease Mother         CABG   • Diabetes Mother    • Pancreatic cancer Father    • Diabetes Brother    • Heart disease Son         CABG         Review of Systems   Constitution: Negative for decreased appetite, diaphoresis, fever, weakness, malaise/fatigue, weight gain and weight loss.   HENT: Negative for congestion, hearing loss, nosebleeds and tinnitus.    Eyes: Negative for blurred vision, double vision, vision loss in left eye, vision loss in right eye and visual disturbance.   Cardiovascular:        As noted in HPI   Respiratory:        As noted HPI   Endocrine: Negative for cold intolerance and heat intolerance.   Hematologic/Lymphatic: Negative for bleeding problem. Does not bruise/bleed easily.   Skin: Negative for color change, flushing, itching and rash.   Musculoskeletal: Negative for arthritis, back pain, joint pain, joint swelling, muscle weakness and myalgias.   Gastrointestinal: Negative for bloating, abdominal pain, constipation, diarrhea, dysphagia, heartburn, hematemesis, hematochezia, melena, nausea and vomiting.   Genitourinary: Negative for bladder incontinence, dysuria, frequency, nocturia and urgency.   Neurological: Negative for dizziness, focal weakness, headaches, light-headedness, loss of balance, numbness, paresthesias and vertigo.   Psychiatric/Behavioral: Negative for depression, memory loss and substance abuse.       Procedures    Procedures        Objective:    Ht 170.2 cm (67\")   Wt 74.8 kg (165 lb)   BMI 25.84 kg/m²        Physical Exam  Physical Exam   Constitutional: He is oriented to person, place, and time. He appears well-developed and well-nourished. No distress.   HENT:   Head: Normocephalic.   Eyes: Pupils are equal, round, and reactive to light. " Conjunctivae are normal. No scleral icterus.   Neck: Normal carotid pulses, no hepatojugular reflux and no JVD present. Carotid bruit is not present. No tracheal deviation, no edema and no erythema present. No thyromegaly present.   Cardiovascular: Normal rate, regular rhythm, S1 normal, S2 normal, normal heart sounds and intact distal pulses.   No extrasystoles are present. PMI is not displaced.  Exam reveals no gallop, no distant heart sounds and no friction rub.    No murmur heard.  Pulses:       Carotid pulses are 2+ on the right side, and 2+ on the left side.       Radial pulses are 2+ on the right side, and 2+ on the left side.        Femoral pulses are 2+ on the right side, and 2+ on the left side.       Dorsalis pedis pulses are 2+ on the right side, and 2+ on the left side.        Posterior tibial pulses are 2+ on the right side, and 2+ on the left side.   LVAD hum   Pulmonary/Chest: Effort normal and breath sounds normal. No respiratory distress. He has no decreased breath sounds. He has no wheezes. He has no rhonchi. He has no rales. He exhibits no tenderness.   Abdominal: Soft. Bowel sounds are normal. He exhibits no distension and no mass. There is no hepatosplenomegaly. There is no tenderness. There is no rebound and no guarding.   Musculoskeletal: He exhibits no edema, tenderness or deformity.   Neurological: He is alert and oriented to person, place, and time.   Skin: Skin is warm and dry. No rash noted. He is not diaphoretic. No cyanosis or erythema. No pallor. Nails show no clubbing.   Psychiatric: He has a normal mood and affect. His speech is normal and behavior is normal. Judgment and thought content normal.           Assessment:    1. This is a 63-year-old gentleman with history of severe ischemic cardiomyopathy. Not a re-operative candidate due to nonviable myocardium. Recurrent heart failure status post  BiV AICD placement. Then worsening symptoms and an echocardiogram in 04/2013 showed a  left ventricular ejection fraction of 20%, mild mitral insufficiency, grade III diastolic  dysfunction. Catheterization showed normal pressures but new severe disease in the left anterior descending and underwent angioplasty with drug-eluting stent placement to that  vessel. He presented with acute pulmonary edema in 08/2015. Catheterization showed insignificant disease and a left ventricular ejection fraction of 15%.  Status post left ventricular assist device being followed at the [facility] heart failure clinic.  He will just see us if needed.  2. Congestive heart failure as outlined above.   3. Hyperlipidemia on rosuvastatin.   4. History of CVA/TIA in the past due to apical thrombus.  And a recurrent episode after his ventricular assist device was placed on chronic warfarin therapy.  5. Status post bi-V defibrillator will continue following her defibrillator clinic.           Plan:

## 2018-12-07 ENCOUNTER — TELEPHONE (OUTPATIENT)
Dept: CARDIOLOGY | Facility: CLINIC | Age: 63
End: 2018-12-07

## 2018-12-07 NOTE — TELEPHONE ENCOUNTER
Barbara with Dr. Fox's office called to request last device interrogation be faxed to them.  Fax# [phone].  Barbara's phone number is [phone]. Thank you/ AN

## 2020-07-01 ENCOUNTER — HOSPITAL ENCOUNTER (OUTPATIENT)
Dept: INFUSION THERAPY | Facility: HOSPITAL | Age: 65
Setting detail: INFUSION SERIES
Discharge: HOME | End: 2020-07-01

## 2020-07-01 VITALS
WEIGHT: 155 LBS | OXYGEN SATURATION: 99 % | TEMPERATURE: 97.4 F | BODY MASS INDEX: 24.33 KG/M2 | HEART RATE: 69 BPM | DIASTOLIC BLOOD PRESSURE: 82 MMHG | RESPIRATION RATE: 15 BRPM | SYSTOLIC BLOOD PRESSURE: 109 MMHG | HEIGHT: 67 IN

## 2020-07-01 DIAGNOSIS — I25.10 ASCVD (ARTERIOSCLEROTIC CARDIOVASCULAR DISEASE): Primary | ICD-10-CM

## 2020-07-01 LAB
ABO GROUP BLD: NORMAL
BASOPHILS # BLD AUTO: 0.1 10*3/MM3 (ref 0–0.2)
BASOPHILS NFR BLD AUTO: 1 % (ref 0–1.5)
BB HOLD TUBE: NORMAL
BLD GP AB SCN SERPL QL: NEGATIVE
DEPRECATED RDW RBC AUTO: 52.9 FL (ref 37–54)
EOSINOPHIL # BLD AUTO: 0.1 10*3/MM3 (ref 0–0.4)
EOSINOPHIL NFR BLD AUTO: 1.5 % (ref 0.3–6.2)
ERYTHROCYTE [DISTWIDTH] IN BLOOD BY AUTOMATED COUNT: 17.8 % (ref 12.3–15.4)
HCT VFR BLD AUTO: 29.7 % (ref 37.5–51)
HGB BLD-MCNC: 9 G/DL (ref 13–17.7)
LYMPHOCYTES # BLD AUTO: 0.5 10*3/MM3 (ref 0.7–3.1)
LYMPHOCYTES NFR BLD AUTO: 8.4 % (ref 19.6–45.3)
MCH RBC QN AUTO: 25.5 PG (ref 26.6–33)
MCHC RBC AUTO-ENTMCNC: 30.4 G/DL (ref 31.5–35.7)
MCV RBC AUTO: 84 FL (ref 79–97)
MONOCYTES # BLD AUTO: 0.6 10*3/MM3 (ref 0.1–0.9)
MONOCYTES NFR BLD AUTO: 9.8 % (ref 5–12)
NEUTROPHILS NFR BLD AUTO: 5 10*3/MM3 (ref 1.7–7)
NEUTROPHILS NFR BLD AUTO: 79.3 % (ref 42.7–76)
NRBC BLD AUTO-RTO: 0.3 /100 WBC (ref 0–0.2)
PLATELET # BLD AUTO: 226 10*3/MM3 (ref 140–450)
PMV BLD AUTO: 8 FL (ref 6–12)
RBC # BLD AUTO: 3.53 10*6/MM3 (ref 4.14–5.8)
RH BLD: POSITIVE
T&S EXPIRATION DATE: NORMAL
WBC # BLD AUTO: 6.3 10*3/MM3 (ref 3.4–10.8)

## 2020-07-01 PROCEDURE — 86850 RBC ANTIBODY SCREEN: CPT | Performed by: NURSE PRACTITIONER

## 2020-07-01 PROCEDURE — 86900 BLOOD TYPING SEROLOGIC ABO: CPT

## 2020-07-01 PROCEDURE — 86900 BLOOD TYPING SEROLOGIC ABO: CPT | Performed by: NURSE PRACTITIONER

## 2020-07-01 PROCEDURE — 36430 TRANSFUSION BLD/BLD COMPNT: CPT

## 2020-07-01 PROCEDURE — 86901 BLOOD TYPING SEROLOGIC RH(D): CPT

## 2020-07-01 PROCEDURE — 86923 COMPATIBILITY TEST ELECTRIC: CPT

## 2020-07-01 PROCEDURE — 85025 COMPLETE CBC W/AUTO DIFF WBC: CPT | Performed by: NURSE PRACTITIONER

## 2020-07-01 PROCEDURE — P9016 RBC LEUKOCYTES REDUCED: HCPCS

## 2020-07-01 PROCEDURE — 86901 BLOOD TYPING SEROLOGIC RH(D): CPT | Performed by: NURSE PRACTITIONER

## 2020-07-01 RX ORDER — SODIUM CHLORIDE 9 MG/ML
250 INJECTION, SOLUTION INTRAVENOUS AS NEEDED
Status: DISCONTINUED | OUTPATIENT
Start: 2020-07-01 | End: 2020-07-03 | Stop reason: HOSPADM

## 2020-07-02 LAB
BH BB BLOOD EXPIRATION DATE: NORMAL
BH BB BLOOD TYPE BARCODE: 6200
BH BB DISPENSE STATUS: NORMAL
BH BB PRODUCT CODE: NORMAL
BH BB UNIT NUMBER: NORMAL
CROSSMATCH INTERPRETATION: NORMAL
UNIT  ABO: NORMAL
UNIT  RH: NORMAL